# Patient Record
Sex: FEMALE | Race: OTHER | NOT HISPANIC OR LATINO | URBAN - METROPOLITAN AREA
[De-identification: names, ages, dates, MRNs, and addresses within clinical notes are randomized per-mention and may not be internally consistent; named-entity substitution may affect disease eponyms.]

---

## 2018-08-21 ENCOUNTER — EMERGENCY (EMERGENCY)
Facility: HOSPITAL | Age: 28
LOS: 1 days | Discharge: HOME | End: 2018-08-21
Attending: EMERGENCY MEDICINE
Payer: COMMERCIAL

## 2018-08-21 VITALS
HEART RATE: 90 BPM | TEMPERATURE: 98 F | SYSTOLIC BLOOD PRESSURE: 115 MMHG | RESPIRATION RATE: 19 BRPM | DIASTOLIC BLOOD PRESSURE: 78 MMHG | OXYGEN SATURATION: 99 %

## 2018-08-21 VITALS
SYSTOLIC BLOOD PRESSURE: 109 MMHG | HEART RATE: 100 BPM | DIASTOLIC BLOOD PRESSURE: 69 MMHG | HEIGHT: 66 IN | RESPIRATION RATE: 19 BRPM | TEMPERATURE: 98 F | WEIGHT: 125 LBS | OXYGEN SATURATION: 100 %

## 2018-08-21 PROCEDURE — 99282 EMERGENCY DEPT VISIT SF MDM: CPT | Mod: 25

## 2018-08-21 PROCEDURE — 12011 RPR F/E/E/N/L/M 2.5 CM/<: CPT

## 2018-08-21 PROCEDURE — 99283 EMERGENCY DEPT VISIT LOW MDM: CPT | Mod: 25
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
## 2018-08-21 NOTE — ED PROCEDURE NOTE - ATTENDING CONTRIBUTION TO CARE
Eryn Costello MD - Attending Physician: Attending MD Eryn Costello: Risks, benefit and alternatives of procedure explained to patient and patient demonstrated verbal understanding and consent.  I was present during the key portions of the procedure. Patient tolerated procedure well without complications

## 2018-08-21 NOTE — ED PROVIDER NOTE - PROGRESS NOTE DETAILS
Suture repair of both lacerations was performed successfully without complication. Good cosmesis was achieved and the patient is pleased with the results. Will discharge the patient.

## 2018-08-21 NOTE — ED PROVIDER NOTE - PHYSICAL EXAMINATION
Gen: AAOx3, non-toxic  Head: NCAT  HEENT: EOMI, oral mucosa moist, normal conjunctiva  Lung: CTAB, no respiratory distress, no wheezes/rhonchi/rales B/L, speaking in full sentences  CV: RRR, no murmurs, rubs or gallops  Abd: soft, NTND, no guarding, no CVA tenderness  MSK: no visible deformities  Neuro: No focal sensory or motor deficits  Skin: +two lacerations as described in HPI  Psych: normal affect.     ~Soy Fagan PGY1
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
## 2018-08-21 NOTE — ED PROCEDURE NOTE - PROCEDURE ADDITIONAL DETAILS
Two linear forehead lacerations (1cm and 1.5cm) were copiously irrigated and repaired with two and three (respectively) 6/0 fast gut sutures. Good cosmesis was achieved.

## 2018-08-21 NOTE — ED PROVIDER NOTE - NS ED ROS FT
GENERAL: No fever or chills  EYES: no change in vision  HEENT: no trouble swallowing or speaking  CARDIAC: no chest pain  PULMONARY: no cough or SOB  GI: no abdominal pain, no nausea, no vomiting, no diarrhea or constipation  : No changes in urination  SKIN: +two small, linear, 1cm lacerations on the upper middle forehead.    NEURO: +mild headache. no neuro sxs  MSK: No joint pain     ~Soy Fagan PGY1

## 2018-08-21 NOTE — ED ADULT NURSE NOTE - NSIMPLEMENTINTERV_GEN_ALL_ED
Implemented All Universal Safety Interventions:  [place] to call system. Call bell, personal items and telephone within reach. Instruct patient to call for assistance. Room bathroom lighting operational. Non-slip footwear when patient is off stretcher. Physically safe environment: no spills, clutter or unnecessary equipment. Stretcher in lowest position, wheels locked, appropriate side rails in place.

## 2018-08-21 NOTE — ED PROVIDER NOTE - ATTENDING CONTRIBUTION TO CARE
Eryn Costello MD - Attending Physician: I have personally seen and examined this patient with the resident/fellow.  I have fully participated in the care of this patient. I have reviewed all pertinent clinical information, including history, physical exam, plan and the Resident/Fellow’s note and agree except as noted. See MDM
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
## 2018-08-21 NOTE — ED PROVIDER NOTE - MEDICAL DECISION MAKING DETAILS
28F no PMHx presenting to the ED with two forehead lacerations. No concern for intracranial pathology--no changes in vision, no neurologic findings, not a significant mechanism. Will plan to perform suture repair. Patient is declining Tdap vaccination. 28F no PMHx presenting to the ED with two forehead lacerations. No concern for intracranial pathology--no changes in vision, no neurologic findings, not a significant mechanism. Will plan to perform suture repair. Patient is declining Tdap vaccination.    Eryn Costello MD - Attending Physician: Pt here with small forehead lacerations. Declining update Tdap, aware of risks. No concerning [facility] red flags. Suture for dc